# Patient Record
Sex: FEMALE | Race: WHITE | URBAN - METROPOLITAN AREA
[De-identification: names, ages, dates, MRNs, and addresses within clinical notes are randomized per-mention and may not be internally consistent; named-entity substitution may affect disease eponyms.]

---

## 2018-08-09 ENCOUNTER — IMPORTED ENCOUNTER (OUTPATIENT)
Dept: URBAN - METROPOLITAN AREA CLINIC 50 | Facility: CLINIC | Age: 73
End: 2018-08-09

## 2021-04-17 ASSESSMENT — VISUAL ACUITY
OD_OTHER: 20/30-. 20/80.
OD_CC: 20/40-2
OS_CC: J1+
OS_BAT: 20/30
OS_CC: 20/30-1
OD_CC: J1+
OD_BAT: 20/30-
OS_OTHER: 20/30. 20/70.

## 2021-04-17 ASSESSMENT — TONOMETRY
OD_IOP_MMHG: 16
OS_IOP_MMHG: 15

## 2022-02-14 NOTE — PATIENT DISCUSSION
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